# Patient Record
Sex: MALE | Race: WHITE | Employment: OTHER | URBAN - METROPOLITAN AREA
[De-identification: names, ages, dates, MRNs, and addresses within clinical notes are randomized per-mention and may not be internally consistent; named-entity substitution may affect disease eponyms.]

---

## 2018-06-25 ENCOUNTER — OFFICE VISIT (OUTPATIENT)
Dept: URGENT CARE | Facility: URGENT CARE | Age: 68
End: 2018-06-25
Payer: COMMERCIAL

## 2018-06-25 VITALS
RESPIRATION RATE: 16 BRPM | TEMPERATURE: 98.4 F | WEIGHT: 202 LBS | OXYGEN SATURATION: 96 % | HEART RATE: 75 BPM | DIASTOLIC BLOOD PRESSURE: 76 MMHG | SYSTOLIC BLOOD PRESSURE: 139 MMHG

## 2018-06-25 DIAGNOSIS — S40.022A: Primary | ICD-10-CM

## 2018-06-25 PROCEDURE — 99202 OFFICE O/P NEW SF 15 MIN: CPT | Performed by: FAMILY MEDICINE

## 2018-06-25 RX ORDER — ROSUVASTATIN CALCIUM 10 MG/1
10 TABLET, COATED ORAL
COMMUNITY

## 2018-06-25 RX ORDER — LISINOPRIL/HYDROCHLOROTHIAZIDE 10-12.5 MG
1 TABLET ORAL
COMMUNITY

## 2018-06-25 RX ORDER — PREDNISOLONE ACETATE 10 MG/ML
1 SUSPENSION/ DROPS OPHTHALMIC
COMMUNITY
Start: 2018-04-04

## 2018-06-25 ASSESSMENT — ENCOUNTER SYMPTOMS: SHORTNESS OF BREATH: 0

## 2018-06-25 ASSESSMENT — PAIN SCALES - GENERAL: PAINLEVEL: NO PAIN (0)

## 2018-06-25 NOTE — PROGRESS NOTES
"SUBJECTIVE:   Aung Mcfarland is a 68 year old male presenting with a chief complaint of   Chief Complaint   Patient presents with     Derm Problem     C/o dark spot on left arm since friday. Not itchy, no fevers.       He is a new patient of [facility].    Rash    Onset of rash was 3 day(s) ago.   Course of illness is sudden onset.  Severity moderate  Current and Associated symptoms: red   Location of the rash: arm, lower.  Previous history of a similar rash? No  Recent exposure history: was at the \"back to the 50's\" and noticed when applying sunscreen  Denies exposure to: none known  Associated symptoms include: nothing.  Treatment measures tried include: none    PMH:  NO h/o skin cancer;  HTN & Chol.      Meds:  Takes one aspirin per day      Review of Systems   Respiratory: Negative for shortness of breath.    Cardiovascular: Negative for chest pain.   Skin: Positive for rash.       History reviewed. No pertinent past medical history.  History reviewed. No pertinent family history.  Current Outpatient Prescriptions   Medication Sig Dispense Refill     prednisoLONE acetate (PRED FORTE) 1 % ophthalmic susp 1 drop       lisinopril-hydrochlorothiazide (PRINZIDE/ZESTORETIC) 10-12.5 MG per tablet Take 1 tablet by mouth       rosuvastatin (CRESTOR) 10 MG tablet Take 10 mg by mouth       study - aspirin vs placebo (IDS #5239) 1 tablet tablet Take 81 mg by mouth       Social History   Substance Use Topics     Smoking status: Former Smoker     Smokeless tobacco: Never Used     Alcohol use Yes      Comment: rare       OBJECTIVE  /76  Pulse 75  Temp 98.4  F (36.9  C) (Oral)  Resp 16  Wt 202 lb (91.6 kg)  SpO2 96%    Physical Exam   Constitutional: He appears well-developed and well-nourished.   HENT:   Head: Normocephalic.   Neck: Normal range of motion.   Pulmonary/Chest: Effort normal.   Skin: Skin is warm and dry.        Nursing note and vitals reviewed.      Labs:  No results found for this or any previous " visit (from the past 24 hour(s)).    X-Ray was not done.    ASSESSMENT:      ICD-10-CM    1. Superficial bruising of upper limb, left, initial encounter S40.022A         Medical Decision Making:    Differential Diagnosis:  Rash: skin cancer; injury; hematoma; ecchymosis    Serious Comorbid Conditions:  Adult:  None    PLAN:    Rash:  Observation and remeasure in 2-3 weeks.  If larger, recheck at PCP clinic; if smaller, monitor    Followup:    If not improving or if condition worsens, follow up with your Primary Care Provider    Patient Instructions   See your primary in 2-3 weeks if lesion has not gotten smaller or lighter (current size is 4mm x 6mm)      Upper Extremity Contusion  You have a contusion (bruise) of an upper extremity (arm, wrist, hand, or fingers). Symptoms include pain, swelling, and skin discoloration. No bones are broken. This injury may take from a few days to a few weeks to heal.  During that time, the bruise may change from reddish in color, to purple-blue, to green-yellow, to yellow-brown.  Home care    Unless another medicine was prescribed, you can take acetaminophen, ibuprofen, or naproxen to control pain. (If you have chronic liver or kidney disease or ever had a stomach ulcer or gastrointestinal bleeding, talk with your doctor before using these medicines.)    Elevate the injured area to reduce pain and swelling. As much as possible, sit or lie down with the injured area raised about the level of your heart. This is especially important during the first 48 hours.    Ice the injured area to help reduce pain and swelling. Wrap a cold source (ice pack or ice cubes in a plastic bag) in a thin towel. Apply to the bruised area for 20 minutes every 1 to 2 hours the first day. Continue this 3 to 4 times a day until the pain and swelling goes away.    If a sling was provided, you may remove it to shower or bathe. To prevent joint stiffness, do not wear it for more than 1 week.  Follow-up  care  Follow up with your healthcare provide, or as advised. Call if you are not improving within the next 1 to 2 weeks.  When to seek medical advice   Call your healthcare provider right away if any of these occur:    Increased pain or swelling    Hand or fingers become cold, blue, numb or tingly    Signs of infection: Warmth, drainage, or increased redness or pain around the injury    Inability to move the injured body part     Frequent bruising for unknown reasons  Date Last Reviewed: 2/1/2017 2000-2017 [employer] [address] [place], [place] [address]. All rights reserved. This information is not intended as a substitute for professional medical care. Always follow your healthcare professional's instructions.

## 2018-06-25 NOTE — MR AVS SNAPSHOT
After Visit Summary   6/25/2018    Aung Mcfarland    MRN: [id number]           Patient Information     Date Of Birth          1950        Visit Information        Provider Department      6/25/2018 5:05 PM Antwon Lewis MD [facility]        Today's Diagnoses     Superficial bruising of upper limb, left, initial encounter    -  1      Care Instructions    See your primary in 2-3 weeks if lesion has not gotten smaller or lighter (current size is 4mm x 6mm)      Upper Extremity Contusion  You have a contusion (bruise) of an upper extremity (arm, wrist, hand, or fingers). Symptoms include pain, swelling, and skin discoloration. No bones are broken. This injury may take from a few days to a few weeks to heal.  During that time, the bruise may change from reddish in color, to purple-blue, to green-yellow, to yellow-brown.  Home care    Unless another medicine was prescribed, you can take acetaminophen, ibuprofen, or naproxen to control pain. (If you have chronic liver or kidney disease or ever had a stomach ulcer or gastrointestinal bleeding, talk with your doctor before using these medicines.)    Elevate the injured area to reduce pain and swelling. As much as possible, sit or lie down with the injured area raised about the level of your heart. This is especially important during the first 48 hours.    Ice the injured area to help reduce pain and swelling. Wrap a cold source (ice pack or ice cubes in a plastic bag) in a thin towel. Apply to the bruised area for 20 minutes every 1 to 2 hours the first day. Continue this 3 to 4 times a day until the pain and swelling goes away.    If a sling was provided, you may remove it to shower or bathe. To prevent joint stiffness, do not wear it for more than 1 week.  Follow-up care  Follow up with your healthcare provide, or as advised. Call if you are not improving within the next 1 to 2 weeks.  When to seek medical advice   Call your  healthcare provider right away if any of these occur:    Increased pain or swelling    Hand or fingers become cold, blue, numb or tingly    Signs of infection: Warmth, drainage, or increased redness or pain around the injury    Inability to move the injured body part     Frequent bruising for unknown reasons  Date Last Reviewed: 2/1/2017 2000-2017 [employer]. [address] [place] [address]. All rights reserved. This information is not intended as a substitute for professional medical care. Always follow your healthcare professional's instructions.                Follow-ups after your visit        Follow-up notes from your care team     Return if symptoms worsen or fail to improve.      Who to contact     If you have questions or need follow up information about today's clinic visit or your schedule please contact [facility] directly at [phone].  Normal or non-critical lab and imaging results will be communicated to you by MyChart, letter or phone within 4 business days after the clinic has received the results. If you do not hear from us within 7 days, please contact the clinic through MyChart or phone. If you have a critical or abnormal lab result, we will notify you by phone as soon as possible.  Submit refill requests through [employer] or call your pharmacy and they will forward the refill request to us. Please allow 3 business days for your refill to be completed.          Additional Information About Your Visit        Care EveryWhere ID     This is your Care EveryWhere ID. This could be used by other organizations to access your [place] medical records  [id number]        Your Vitals Were     Pulse Temperature Respirations Pulse Oximetry          75 98.4  F (36.9  C) (Oral) 16 96%         Blood Pressure from Last 3 Encounters:   06/25/18 139/76    Weight from Last 3 Encounters:   06/25/18 202 lb (91.6 kg)              Today, you had the following     No orders  found for display       Primary Care Provider Office Phone # Fax #    [facility] [phone] [address] [facility] [address]        Equal Access to Services     CARLOTA MELENDEZ : Hadii yannick ku mendezo Soanthonyali, waaxda luqadaha, qaybta kaalmada adebrittanyda, christian jalloh laRanjitsherman mckinney. So [facility] [phone].    ATENCIÓN: Si habla español, tiene a mcneill disposición servicios gratuitos de asistencia lingüística. Llame al [phone].    We comply with applicable federal civil rights laws and [place] laws. We do not discriminate on the basis of race, color, national origin, age, disability, sex, sexual orientation, or gender identity.            Thank you!     Thank you for choosing [facility]  for your care. Our goal is always to provide you with excellent care. Hearing back from our patients is one way we can continue to improve our services. Please take a few minutes to complete the written survey that you may receive in the mail after your visit with us. Thank you!             Your Updated Medication List - Protect others around you: Learn how to safely use, store and throw away your medicines at www.disposemymeds.org.          This list is accurate as of 6/25/18  5:19 PM.  Always use your most recent med list.                   Brand Name Dispense Instructions for use Diagnosis    lisinopril-hydrochlorothiazide 10-12.5 MG per tablet    PRINZIDE/ZESTORETIC     Take 1 tablet by mouth        prednisoLONE acetate 1 % ophthalmic susp    PRED FORTE     1 drop        rosuvastatin 10 MG tablet    CRESTOR     Take 10 mg by mouth        study - aspirin vs placebo 1 tablet tablet    IDS #5239     Take 81 mg by mouth

## 2018-06-25 NOTE — PATIENT INSTRUCTIONS
See your primary in 2-3 weeks if lesion has not gotten smaller or lighter (current size is 4mm x 6mm)      Upper Extremity Contusion  You have a contusion (bruise) of an upper extremity (arm, wrist, hand, or fingers). Symptoms include pain, swelling, and skin discoloration. No bones are broken. This injury may take from a few days to a few weeks to heal.  During that time, the bruise may change from reddish in color, to purple-blue, to green-yellow, to yellow-brown.  Home care    Unless another medicine was prescribed, you can take acetaminophen, ibuprofen, or naproxen to control pain. (If you have chronic liver or kidney disease or ever had a stomach ulcer or gastrointestinal bleeding, talk with your doctor before using these medicines.)    Elevate the injured area to reduce pain and swelling. As much as possible, sit or lie down with the injured area raised about the level of your heart. This is especially important during the first 48 hours.    Ice the injured area to help reduce pain and swelling. Wrap a cold source (ice pack or ice cubes in a plastic bag) in a thin towel. Apply to the bruised area for 20 minutes every 1 to 2 hours the first day. Continue this 3 to 4 times a day until the pain and swelling goes away.    If a sling was provided, you may remove it to shower or bathe. To prevent joint stiffness, do not wear it for more than 1 week.  Follow-up care  Follow up with your healthcare provide, or as advised. Call if you are not improving within the next 1 to 2 weeks.  When to seek medical advice   Call your healthcare provider right away if any of these occur:    Increased pain or swelling    Hand or fingers become cold, blue, numb or tingly    Signs of infection: Warmth, drainage, or increased redness or pain around the injury    Inability to move the injured body part     Frequent bruising for unknown reasons  Date Last Reviewed: 2/1/2017 2000-2017 [employer]. [address] [facility],  [place] [address]. All rights reserved. This information is not intended as a substitute for professional medical care. Always follow your healthcare professional's instructions.

## 2018-06-25 NOTE — NURSING NOTE
Chief Complaint   Patient presents with     Derm Problem     C/o dark spot on left arm since friday. Not itchy, no fevers.       Initial /76  Pulse 75  Temp 98.4  F (36.9  C) (Oral)  Resp 16  Wt 202 lb (91.6 kg)  SpO2 96% There is no height or weight on file to calculate BMI..  BP completed using cuff size: connie Mar CMA

## 2023-10-10 ENCOUNTER — VIRTUAL VISIT (OUTPATIENT)
Dept: CARDIOLOGY | Facility: CLINIC | Age: 73
End: 2023-10-10
Attending: GENETIC COUNSELOR, MS
Payer: COMMERCIAL

## 2023-10-10 ENCOUNTER — TELEPHONE (OUTPATIENT)
Dept: CARE COORDINATION | Facility: CLINIC | Age: 73
End: 2023-10-10

## 2023-10-10 VITALS — OXYGEN SATURATION: 97 % | HEIGHT: 67 IN | WEIGHT: 181.2 LBS | BODY MASS INDEX: 28.44 KG/M2

## 2023-10-10 DIAGNOSIS — I42.1 HYPERTROPHIC OBSTRUCTIVE CARDIOMYOPATHY (H): Primary | ICD-10-CM

## 2023-10-10 PROCEDURE — 96040 HC GENETIC COUNSELING, EACH 30 MINUTES: CPT | Mod: GT | Performed by: GENETIC COUNSELOR, MS

## 2023-10-10 RX ORDER — ATENOLOL 25 MG/1
1 TABLET ORAL
COMMUNITY
Start: 2022-11-08

## 2023-10-10 ASSESSMENT — PAIN SCALES - GENERAL: PAINLEVEL: NO PAIN (0)

## 2023-10-10 NOTE — PATIENT INSTRUCTIONS
"Indication for Genetic Counseling:     Hypertrophic cardiomyopathy (HCM) is a condition that causes part of the heart muscle (the left ventricle) to become thick and enlarged (hypertrophy).  It is usually diagnosed by echocardiogram (ECHO) or cardiac magnetic resonance imaging (MRI).  When the heart becomes enlarged, it cannot pump blood as effectively, which can lead to symptoms such as shortness of breath, fatigue, chest pains, irregular heartbeat, fainting, stroke, cardiac arrest, and sudden cardiac death (SCD).  HCM can be caused by a variety of factors, such as chronic hypertension, a narrow aorta, athletic heart, or genetics.  There are at least 20 known genes that, when not working properly, can cause HCM.    Inheritance:   Humans have over 20,000 genes that instruct our bodies how to function.  We have two copies of each gene because we inherit one from our mother and one from our father.  In most cardiac cases with a genetic component, the condition is inherited in an autosomal dominant (AD) pattern.  This means that in order to have the condition, a person needs to inherit a mutation on one copy of a particular gene.  This mutation or pathogenic variant dominates the \"normal\" working copy of the gene.  When an affected individual has children, they can either pass on the \"normal\" copy of the gene or the mutation.  Therefore, children have a 50% chance of inheriting the mutation.  Other family members also have an increased risk but the specific risk depends on the degree of relationship.  Additional inheritance patterns can occur within families and may alter the risk of recurrence.     Testing Options:   Genetic testing is available to assess a panel of genes known to cause this condition.  This test reads through the DNA (sequencing) of these genes to look for spelling mistakes or mutations that could cause the condition.      There are three types of results you could receive from this test.     " -Positive result (mutation identified) - confirms diagnosis and provides an answer to why this happened.  In addition, identifying a mutation allows family members to have testing to determine their risk.     -Negative result (mutation not identified) - no genetic changes were identified.  This does not rule out a genetic cause for the condition as the genetic testing only identifies 50-60% of genetic causes for this condition.    -Variant of uncertain significance (VUS) - a genetic change was identified, but there is not enough information to determine whether it is disease-causing or normal human genetic variation.     Although genetic testing may identify a mutation, it cannot provide information about the severity of symptoms or the progression of disease.  We cannot predict age of onset or severity of symptoms due to reduced penetrance and variable expressivity.    Logistics:   Genetic testing involves collecting a sample of DNA, thru blood, saliva, or cheek cells.  The sample will be sent to a laboratory to extract the DNA and sequence the genes for mutations.  The laboratory will work with your insurance company to determine the out of pocket (OOP) cost and will notify you if the OOP cost is greater than $100.  Remember to ask the lab about financial assistance pricing and self pay options as well.  Sometimes those are much lower than insurance pricing.  When testing is initiated, results take about 2-4 weeks to return. I will contact you over the phone when results are available.     Genetic Information and Nondiscrimination Act:  The Genetic Information and Nondiscrimination Act of 2008 (CHACHA) is a federal law that protects individuals from genetic discrimination in health insurance and employment. Genetic discrimination is defined as the misuse of genetic information. This law does not address potential discrimination regarding life insurance or disability insurance.      This is especially relevant for at  risk individuals who are considering presymptomatic testing.    Screening Recommendations:  Recommend clinical evaluation for all first degree relatives (parents, siblings, and children) of an affected individual regardless of decision to pursue genetic testing.  Clinical evaluation should be performed at least every 3 years, except yearly during puberty, and should include history, cardiac exam, ECHO, EKG, Holter monitoring, and exercise treadmill.    Resources:  Hypertrophic Cardiomyopathy Association - 4hcm.org  Children's Cardiomyopathy Foundation - childrenscardiomyopathy.org  [place] Cardiomyopathy Association - cardiomyopathy.org  HCM Care phone teagan    General   American Heart Association - americanheart.org  Genetics Home Reference - ghr.Xanodyne.nih.gov  Genetic Information and Nondiscrimination Act - ginahelp.org    Contact Information:  Krystle Snyder MS  Licensed Genetic Counselor  [facility]  [facility]    Office:  [phone]  Appointments:  [phone]  Fax: [phone]  Email: [email or web address]

## 2023-10-10 NOTE — PROGRESS NOTES
Virtual Visit Details  Type of service:  Video Visit   Video Start Time: 3:32 PM  Video End Time:4:24 PM    Originating Location (pt. Location): Home  Distant Location (provider location):  On-site  Platform used for Video Visit: Iris    PROVIDER APPOINTMENT  Here is a copy of the progress note from your recent genetic counseling visit through the [facility] on Date: 10/10/2023.  Patient was seen through a virtual visit.    PROGRESS NOTE:Aung was referred by Yamileth Martinez MD at [facility] for genetic counseling due to his history of hypertrophic cardiomyopathy (HCM).  I had the opportunity to talk with Aung and his wife Rebecca today to discuss the genetic component of HCM and testing options available to him.     MEDICAL HISTORY: Chas was diagnosed with HCM in 2019 while undergoing lung surgery.  At that time he was found to have an murmur which lead to further evaluation.  Echocardiograms showed Systolic Anterior Motion of the Mitral Valve without a significant gradient, mildly thickened mitral valve with mild to moderate eccentric MR, normal LV size and systolic function (LVEF 55-60%), moderate left atrial enlargement, and asymmetric septal hypertrophy (IVS 2.0 cm) consistent with HCM.    More recently he was admitted for hematuria and had cardiac imaging done. Echocardiogram on 2023 showed hyperdynamic heart function (LVEF 70%); basal septal hypertrophy measuring up to 2.0 cm with evidence of systolic anterior motion of the mitral valve consistent with hypertrophic obstructive cardiomyopathy. Around this time he also started noticing some lightheadedness     History is also remarkable for severe CAD noted on chest CT, peripheral artery disease, hypertension, hyperlipidemia, lung cancer    FAMILY HISTORY:A detailed family history was obtained during today's consult.  Family history was significant for the following cardiac history:  Brother  suddenly at 68 years.  "Cause is unknown but heart attack was questioned.    Sister  at 47 years from emphysema.  Her three sons have all  young - one  suddenly at 48 years, again a heart attack was suspected.  Another son  at 50 from strokes and cancer. Another son  in a car accident at 41 years but this was thought to have been due to a heart event.  Brother  at 74 with COPD and a weak heart.  He had a history of fainting.  Sister is currently in hospice dues to her history of strokes.  Father  at 70 years with throat cancer.  Paternal uncle  at 65 with aortic aneurysm.  Paternal aunt  at 37 years, no details known.  Paternal uncle  at 78 with lung cancer.  Paternal grandfather  at 53 and grandmother at 91 years. No details are known about cause of death.  Mother  at 92 years with uterine cancer.  She had strokes in her 70-80's.  Maternal uncle  at 83 years with heart problems.  He was told \"he had a big heart from playing the squeeze box\".  7 other aunt/uncles  but few details are known - 2  under 1 year of age.  The others  between 48-90.  Maternal grandmother  at 44 years and grandfather  at 68. No more details are known.  Son (43) has diverticulitis. No children.  Son (36) is in good health. His two children are in good health.  There is no additional history of cardiomyopathy, arrhythmias, heart attacks, fainting, sudden cardiac death, genetic conditions, or birth defects. (A copy of pedigree may be found under media tab).    DISCUSSION: Reviewed definition of hypertrophic cardiomyopathy (HCM). Explained that a good portion of HCM cases have a genetic component.     Reviewed autosomal dominant (AD) inheritance pattern most commonly associated with HCM, including the 50% risk for recurrence. Explained that HCM gene mutations are associated with reduced penetrance and variable expressivity, meaning that individuals who carry a gene mutation may or may not get the " disease and onset and severity can vary from one family member to the next. This explains why you may not see cardiomyopathy in each generation of a family.     Currently over 30 genes have been found to be related to HCM. Genetic testing currently identifies mutations in about 50-60% of idiopathic cases. Reviewed capabilities, limitations, and logistics of testing.  DNA sample via saliva or blood is collected and sent to testing lab for evaluation of selected genes. The results could directly impact care and treatment.      Explained three possible outcomes of genetic testing including: positive identification of a mutation, no mutation identified, and identification of a variant of unknown significance (VUS). If a mutation is identified, presymptomatic testing would be available to at risk family members. If no mutation is identified, it does not rule out the possibility of a genetic component to this disease. Family members could still be at risk for developing the same condition. If a VUS is identified, it is unclear if the mutation is disease causing or just a normal variation. It may take time and possibly additional testing to determine the meaning of a VUS result.     Test results take approximately 2-4 weeks on average. Discussed cost of testing through commercial labs. Explained that the lab will work with insurance to determine coverage and contact patient if out of pocket costs are expected to exceed $100. If so, patient has the option to pay reported price, cancel testing or elect self pay pricing (typcially around $250).     Discussed pros and cons of genetic testing. Explained that results could determine the cause for HCM. If a mutation is identified, presymptomatic testing is available to all at risk relatives. Reviewed possible issues associated with presymptomatic testing including genetic discrimination, current laws to prevent discrimination (ie. CHACHA), insurance issues, and emotional and  psychosocial outcomes of testing.     Recommend clinical evaluation for all first degree relatives (parents, siblings, and children) of an affected individual regardless of decision to pursue genetic testing. Based on the Heart Failure Society of [place] Practice Guidelines (Agnie et al, 2018), clinical evaluation should be performed at least every 3 years, except yearly during puberty, and should include history, cardiac exam, ECHO, EKG, Holter monitoring, and exercise treadmill.    All questions answered at this time.     PLAN: Aung elected to proceed with genetic testing.  Requisition and consent forms were completed and signed.  DNA will be collected via saliva sample and sent to [facility]. I will contact patient when results are available.    TOTAL TIME SPENT IN COUNSELIN minutes    Krystle Snyder MS, [facility] [place]  Licensed, Certified Genetic Counselor  [facility]

## 2023-10-10 NOTE — NURSING NOTE
Is the patient currently in the state of [place]? YES    Visit mode:VIDEO    If the visit is dropped, the patient can be reconnected by: VIDEO VISIT: Send to e-mail at: [email or web address]    Will anyone else be joining the visit? Pts wife   (If patient encounters technical issues they should call [phone]    How would you like to obtain your AVS? Mail a copy    Are changes needed to the allergy or medication list? Yes Pt not taking Lisinopril-hydrochlorothiazide     Reason for visit: Consult    Chris Jimenez MA VVF

## 2023-10-10 NOTE — LETTER
10/10/2023      RE: Aung Mcfarland  [id number] [address] [id number]       Dear Colleague,    Thank you for the opportunity to participate in the care of your patient, Aung Mcfarland, at the [facility] [place] [facility]. Please see a copy of my visit note below.    Virtual Visit Details  Type of service:  Video Visit   Video Start Time: 3:32 PM  Video End Time:4:24 PM    Originating Location (pt. Location): Home  Distant Location (provider location):  On-site  Platform used for Video Visit: [email or web address]    PROVIDER APPOINTMENT  Here is a copy of the progress note from your recent genetic counseling visit through the [facility] on Date: 10/10/2023.  Patient was seen through a virtual visit.    PROGRESS NOTE:Aung was referred by Yamileth Martinez MD at [facility] for genetic counseling due to his history of hypertrophic cardiomyopathy (HCM).  I had the opportunity to talk with Aung and his wife Rebecca today to discuss the genetic component of HCM and testing options available to him.     MEDICAL HISTORY: Chas was diagnosed with HCM in 2019 while undergoing lung surgery.  At that time he was found to have an murmur which lead to further evaluation.  Echocardiograms showed Systolic Anterior Motion of the Mitral Valve without a significant gradient, mildly thickened mitral valve with mild to moderate eccentric MR, normal LV size and systolic function (LVEF 55-60%), moderate left atrial enlargement, and asymmetric septal hypertrophy (IVS 2.0 cm) consistent with HCM.    More recently he was admitted for hematuria and had cardiac imaging done. Echocardiogram on 4/2023 showed hyperdynamic heart function (LVEF 70%); basal septal hypertrophy measuring up to 2.0 cm with evidence of systolic anterior motion of the mitral valve consistent with hypertrophic obstructive cardiomyopathy. Around this time he also  "started noticing some lightheadedness     History is also remarkable for severe CAD noted on chest CT, peripheral artery disease, hypertension, hyperlipidemia, lung cancer    FAMILY HISTORY:A detailed family history was obtained during today's consult.  Family history was significant for the following cardiac history:  Brother  suddenly at 68 years. Cause is unknown but heart attack was questioned.    Sister  at 47 years from emphysema.  Her three sons have all  young - one  suddenly at 48 years, again a heart attack was suspected.  Another son  at 50 from strokes and cancer. Another son  in a car accident at 41 years but this was thought to have been due to a heart event.  Brother  at 74 with COPD and a weak heart.  He had a history of fainting.  Sister is currently in hospice dues to her history of strokes.  Father  at 70 years with throat cancer.  Paternal uncle  at 65 with aortic aneurysm.  Paternal aunt  at 37 years, no details known.  Paternal uncle  at 78 with lung cancer.  Paternal grandfather  at 53 and grandmother at 91 years. No details are known about cause of death.  Mother  at 92 years with uterine cancer.  She had strokes in her 70-80's.  Maternal uncle  at 83 years with heart problems.  He was told \"he had a big heart from playing the squeeze box\".  7 other aunt/uncles  but few details are known - 2  under 1 year of age.  The others  between 48-90.  Maternal grandmother  at 44 years and grandfather  at 68. No more details are known.  Son (43) has diverticulitis. No children.  Son (36) is in good health. His two children are in good health.  There is no additional history of cardiomyopathy, arrhythmias, heart attacks, fainting, sudden cardiac death, genetic conditions, or birth defects. (A copy of pedigree may be found under media tab).    DISCUSSION: Reviewed definition of hypertrophic cardiomyopathy (HCM). Explained that a " good portion of HCM cases have a genetic component.     Reviewed autosomal dominant (AD) inheritance pattern most commonly associated with HCM, including the 50% risk for recurrence. Explained that HCM gene mutations are associated with reduced penetrance and variable expressivity, meaning that individuals who carry a gene mutation may or may not get the disease and onset and severity can vary from one family member to the next. This explains why you may not see cardiomyopathy in each generation of a family.     Currently over 30 genes have been found to be related to HCM. Genetic testing currently identifies mutations in about 50-60% of idiopathic cases. Reviewed capabilities, limitations, and logistics of testing.  DNA sample via saliva or blood is collected and sent to testing lab for evaluation of selected genes. The results could directly impact care and treatment.      Explained three possible outcomes of genetic testing including: positive identification of a mutation, no mutation identified, and identification of a variant of unknown significance (VUS). If a mutation is identified, presymptomatic testing would be available to at risk family members. If no mutation is identified, it does not rule out the possibility of a genetic component to this disease. Family members could still be at risk for developing the same condition. If a VUS is identified, it is unclear if the mutation is disease causing or just a normal variation. It may take time and possibly additional testing to determine the meaning of a VUS result.     Test results take approximately 2-4 weeks on average. Discussed cost of testing through commercial labs. Explained that the lab will work with insurance to determine coverage and contact patient if out of pocket costs are expected to exceed $100. If so, patient has the option to pay reported price, cancel testing or elect self pay pricing (typcially around $250).     Discussed pros and cons of  genetic testing. Explained that results could determine the cause for HCM. If a mutation is identified, presymptomatic testing is available to all at risk relatives. Reviewed possible issues associated with presymptomatic testing including genetic discrimination, current laws to prevent discrimination (ie. CHACHA), insurance issues, and emotional and psychosocial outcomes of testing.     Recommend clinical evaluation for all first degree relatives (parents, siblings, and children) of an affected individual regardless of decision to pursue genetic testing. Based on the Heart Failure Society of [place] Practice Guidelines (Angie et al, 2018), clinical evaluation should be performed at least every 3 years, except yearly during puberty, and should include history, cardiac exam, ECHO, EKG, Holter monitoring, and exercise treadmill.    All questions answered at this time.     PLAN: Aung elected to proceed with genetic testing.  Requisition and consent forms were completed and signed.  DNA will be collected via saliva sample and [facility] to [facility]. I will contact patient when results are available.    TOTAL TIME SPENT IN COUNSELIN minutes    Krystle Snyder MS, [facility] [place]  Licensed, Certified Genetic Counselor  [facility]       Please do not hesitate to contact me if you have any questions/concerns.     Sincerely,     Krystle Snyder GC

## 2023-10-10 NOTE — TELEPHONE ENCOUNTER
Pt states today's virtual visit to be covered by [facility] Reference # [id number]  Effective 9/19/2023-4/7/2024    Unable to create RAYSHAWN note/no active [facility] guarantor     Guarantor note created

## 2023-10-25 ENCOUNTER — TELEPHONE (OUTPATIENT)
Dept: CARDIOLOGY | Facility: CLINIC | Age: 73
End: 2023-10-25
Payer: COMMERCIAL

## 2023-10-25 NOTE — TELEPHONE ENCOUNTER
Spoke with Aung today to review results of genetic testing. He underwent genetic testing on October 15, 2023 due to his diagnosis of hypertrophic cardiomyopathy (HCM).   Genetic testing for the HCM panel thru [email or web address] revealed that Aung does NOT carry a mutation in the 30 genes analyzed. It is predicted that this panel will identify mutations in 50-60% of HCM cases.   Therefore, this negative result does not rule out a genetic basis for the HCM in Aung but eliminates the option of presymptomatic genetic testing in at risk family members, at this time. However, since this condition could still be genetic, clinical screening is recommended in all first degree relatives (children, siblings, parents).  Clinical screening should include cardiac exam, ECHO, and EKG to assess their current status. Testing may also include heart monitor, stress testing, and MRI.   Explained that with continued research and genetic knowledge the detection rate for identifying mutations may increase over time. Therefore, it is worth touching base in the years to come to learn about new testing options.   A summary letter and copy of the results will be sent to patient. All questions answered at this time.     Krystle Snyder MS, [facility] [place]  Licensed, Certified Genetic Counselor  [facility]  [facility]